# Patient Record
Sex: FEMALE | Race: WHITE | NOT HISPANIC OR LATINO | URBAN - METROPOLITAN AREA
[De-identification: names, ages, dates, MRNs, and addresses within clinical notes are randomized per-mention and may not be internally consistent; named-entity substitution may affect disease eponyms.]

---

## 2021-08-13 ENCOUNTER — OFFICE VISIT (OUTPATIENT)
Dept: URGENT CARE | Facility: CLINIC | Age: 26
End: 2021-08-13
Payer: COMMERCIAL

## 2021-08-13 VITALS
WEIGHT: 105 LBS | RESPIRATION RATE: 16 BRPM | OXYGEN SATURATION: 100 % | TEMPERATURE: 97.2 F | HEART RATE: 76 BPM | BODY MASS INDEX: 20.62 KG/M2 | HEIGHT: 60 IN

## 2021-08-13 DIAGNOSIS — R30.0 DYSURIA: ICD-10-CM

## 2021-08-13 DIAGNOSIS — N30.01 ACUTE CYSTITIS WITH HEMATURIA: Primary | ICD-10-CM

## 2021-08-13 LAB
SL AMB  POCT GLUCOSE, UA: NEGATIVE
SL AMB LEUKOCYTE ESTERASE,UA: ABNORMAL
SL AMB POCT BILIRUBIN,UA: NEGATIVE
SL AMB POCT BLOOD,UA: ABNORMAL
SL AMB POCT CLARITY,UA: ABNORMAL
SL AMB POCT COLOR,UA: YELLOW
SL AMB POCT KETONES,UA: NEGATIVE
SL AMB POCT NITRITE,UA: NEGATIVE
SL AMB POCT PH,UA: 6.5
SL AMB POCT SPECIFIC GRAVITY,UA: 1.01
SL AMB POCT URINE PROTEIN: NEGATIVE
SL AMB POCT UROBILINOGEN: 0.2

## 2021-08-13 PROCEDURE — 99203 OFFICE O/P NEW LOW 30 MIN: CPT | Performed by: PHYSICIAN ASSISTANT

## 2021-08-13 PROCEDURE — 81002 URINALYSIS NONAUTO W/O SCOPE: CPT | Performed by: PHYSICIAN ASSISTANT

## 2021-08-13 RX ORDER — CEPHALEXIN 500 MG/1
500 CAPSULE ORAL EVERY 12 HOURS SCHEDULED
Qty: 14 CAPSULE | Refills: 0 | Status: SHIPPED | OUTPATIENT
Start: 2021-08-13 | End: 2021-08-20

## 2021-08-13 RX ORDER — NORGESTIMATE AND ETHINYL ESTRADIOL 7DAYSX3 LO
1 KIT ORAL DAILY
COMMUNITY
Start: 2021-07-27

## 2021-08-13 NOTE — PROGRESS NOTES
[employer]        NAME: Norman Rubalcava is a 30 y o  female  : 1995    MRN: [id number]  DATE: 2021  TIME: 8:52 AM    Assessment and Plan   Acute cystitis with hematuria [N30 01]  1  Acute cystitis with hematuria  cephalexin (KEFLEX) 500 mg capsule   2  Dysuria  POCT urine dip    Urine culture         Patient Instructions       Follow up with PCP in 3-5 days  Proceed to  ER if symptoms worsen  Chief Complaint     Chief Complaint   Patient presents with    Possible UTI     dysuria and hematuria when urinating for approx 3 days  Denies any other symtoms         History of Present Illness        49-year-old female presents to the clinic with dysuria, hematuria that started approximately 3 days ago  Patient states that she has also noticed frequency and urgency with pressure on her lower abdomen  Patient denies fevers, chills, nausea, vomiting, diarrhea  Review of Systems   Review of Systems   Constitutional: Negative for chills and fever  Respiratory: Negative for shortness of breath  Cardiovascular: Negative for chest pain and palpitations  Gastrointestinal: Positive for abdominal pain  Negative for constipation, diarrhea, nausea and vomiting  Genitourinary: Positive for dysuria, frequency, hematuria and urgency  Negative for flank pain  Musculoskeletal: Negative for back pain  Neurological: Negative for dizziness, syncope, weakness, light-headedness and headaches  All other systems reviewed and are negative          Current Medications       Current Outpatient Medications:     Tri-Lo-Yanet 0 18/0 215/0 25 MG-25 MCG per tablet, Take 1 tablet by mouth daily, Disp: , Rfl:     cephalexin (KEFLEX) 500 mg capsule, Take 1 capsule (500 mg total) by mouth every 12 (twelve) hours for 7 days, Disp: 14 capsule, Rfl: 0    Current Allergies     Allergies as of 2021    (No Known Allergies)            The following portions of the patient's history were reviewed and updated as appropriate: allergies, current medications, past family history, past medical history, past social history, past surgical history and problem list      History reviewed  No pertinent past medical history  History reviewed  No pertinent surgical history  History reviewed  No pertinent family history  Medications have been verified  Objective   Pulse 76   Temp (!) 97 2 °F (36 2 °C)   Resp 16   Ht 5' (1 524 m)   Wt 47 6 kg (105 lb)   SpO2 100%   BMI 20 51 kg/m²   No LMP recorded  Physical Exam     Physical Exam  Vitals and nursing note reviewed  Constitutional:       General: She is not in acute distress  Appearance: She is well-developed  She is not diaphoretic  Pulmonary:      Effort: Pulmonary effort is normal    Abdominal:      General: Bowel sounds are normal       Palpations: Abdomen is soft  Tenderness: There is no abdominal tenderness  There is no right CVA tenderness, left CVA tenderness or guarding  Comments: UA: small leukocytes, negative nitrites, large blood   Skin:     General: Skin is warm and dry  Neurological:      Mental Status: She is alert and oriented to person, place, and time

## 2021-08-13 NOTE — PATIENT INSTRUCTIONS
Urinary Tract Infection in Women   WHAT YOU NEED TO KNOW:   A urinary tract infection (UTI) is caused by bacteria that get inside your urinary tract  Most bacteria that enter your urinary tract come out when you urinate  If the bacteria stay in your urinary tract, you may get an infection  Your urinary tract includes your kidneys, ureters, bladder, and urethra  Urine is made in your kidneys, and it flows from the ureters to the bladder  Urine leaves the bladder through the urethra  A UTI is more common in your lower urinary tract, which includes your bladder and urethra  DISCHARGE INSTRUCTIONS:   Return to the emergency department if:   · You are urinating very little or not at all  · You have a high fever with shaking chills  · You have side or back pain that gets worse  Call your doctor if:   · You have a fever  · You do not feel better after 2 days of taking antibiotics  · You are vomiting  · You have questions or concerns about your condition or care  Medicines:   · Antibiotics  help fight a bacterial infection  If you have UTIs often (called recurrent UTIs), you may be given antibiotics to take regularly  You will be given directions for when and how to use antibiotics  The goal is to prevent UTIs but not cause antibiotic resistance by using antibiotics too often  · Medicines  may be given to decrease pain and burning when you urinate  They will also help decrease the feeling that you need to urinate often  These medicines will make your urine orange or red  · Take your medicine as directed  Contact your healthcare provider if you think your medicine is not helping or if you have side effects  Tell him or her if you are allergic to any medicine  Keep a list of the medicines, vitamins, and herbs you take  Include the amounts, and when and why you take them  Bring the list or the pill bottles to follow-up visits   Carry your medicine list with you in case of an emergency  Follow up with your healthcare provider as directed:  Write down your questions so you remember to ask them during your visits  Prevent another UTI:   · Empty your bladder often  Urinate and empty your bladder as soon as you feel the need  Do not hold your urine for long periods of time  · Wipe from front to back after you urinate or have a bowel movement  This will help prevent germs from getting into your urinary tract through your urethra  · Drink liquids as directed  Ask how much liquid to drink each day and which liquids are best for you  You may need to drink more liquids than usual to help flush out the bacteria  Do not drink alcohol, caffeine, or citrus juices  These can irritate your bladder and increase your symptoms  Your healthcare provider may recommend cranberry juice to help prevent a UTI  · Urinate after you have sex  This can help flush out bacteria passed during sex  · Do not douche or use feminine deodorants  These can change the chemical balance in your vagina  · Change sanitary pads or tampons often  This will help prevent germs from getting into your urinary tract  · Talk to your healthcare provider about your birth control method  You may need to change your method if it is increasing your risk for UTIs  · Wear cotton underwear and clothes that are loose  Tight pants and nylon underwear can trap moisture and cause bacteria to grow  · Vaginal estrogen may be recommended  This medicine helps prevent UTIs in women who have gone through menopause or are in [place]árbara-menopause  · Do pelvic muscle exercises often  Pelvic muscle exercises may help you start and stop urinating  Strong pelvic muscles may help you empty your bladder easier  Squeeze these muscles tightly for 5 seconds like you are trying to hold back urine  Then relax for 5 seconds  Gradually work up to squeezing for 10 seconds  Do 3 sets of 15 repetitions a day, or as directed      © [facility] 2021 Information is for End User's use only and may not be sold, redistributed or otherwise used for commercial purposes  All illustrations and images included in CareNotes® are the copyrighted property of A D A M [employer]  or [employer] Pozo   The above information is an  only  It is not intended as medical advice for individual conditions or treatments  Talk to your doctor, nurse or pharmacist before following any medical regimen to see if it is safe and effective for you

## 2021-08-16 LAB
BACTERIA UR CULT: NORMAL
Lab: NORMAL

## 2023-11-14 ENCOUNTER — OFFICE VISIT (OUTPATIENT)
Dept: FAMILY MEDICINE CLINIC | Facility: CLINIC | Age: 28
End: 2023-11-14
Payer: COMMERCIAL

## 2023-11-14 VITALS
BODY MASS INDEX: 21.01 KG/M2 | HEIGHT: 60 IN | WEIGHT: 107 LBS | OXYGEN SATURATION: 99 % | DIASTOLIC BLOOD PRESSURE: 69 MMHG | HEART RATE: 89 BPM | SYSTOLIC BLOOD PRESSURE: 109 MMHG | TEMPERATURE: 97.2 F

## 2023-11-14 DIAGNOSIS — L70.0 ACNE VULGARIS: ICD-10-CM

## 2023-11-14 DIAGNOSIS — Z13.0 SCREENING FOR DEFICIENCY ANEMIA: ICD-10-CM

## 2023-11-14 DIAGNOSIS — R22.1 LOCALIZED SWELLING, MASS OR LUMP OF NECK: ICD-10-CM

## 2023-11-14 DIAGNOSIS — Z13.228 SCREENING FOR METABOLIC DISORDER: ICD-10-CM

## 2023-11-14 DIAGNOSIS — Z13.220 LIPID SCREENING: ICD-10-CM

## 2023-11-14 DIAGNOSIS — Z13.29 THYROID DISORDER SCREEN: ICD-10-CM

## 2023-11-14 DIAGNOSIS — Z76.89 ENCOUNTER TO ESTABLISH CARE: Primary | ICD-10-CM

## 2023-11-14 PROCEDURE — 99203 OFFICE O/P NEW LOW 30 MIN: CPT | Performed by: NURSE PRACTITIONER

## 2023-11-14 NOTE — PROGRESS NOTES
[facility]        NAME: Nalini Freitas is a 25 y.o. female  : 1995    MRN: [id number]  DATE: 2023  TIME: 4:06 PM    Assessment and Plan   Encounter to establish care [Z76.89]  1. Encounter to establish care        2. Acne vulgaris  Ambulatory Referral to Dermatology      3. Localized swelling, mass or lump of neck  US head neck soft tissue      4. Screening for metabolic disorder  Comprehensive metabolic panel    Comprehensive metabolic panel      5. Lipid screening  Lipid panel    Lipid panel      6. Thyroid disorder screen  TSH, 3rd generation with Free T4 reflex    TSH, 3rd generation with Free T4 reflex      7. Screening for deficiency anemia  CBC and differential    CBC and differential            Patient Instructions     Patient Instructions   Fasting labs as ordered. Schedule annual physical.  US of neck as ordered  Referral to [place] [facility] ordered. Chief Complaint     Chief Complaint   Patient presents with    Establish Care    Acne    Mass     throat         History of Present Illness       New patient to establish care. Has a lump on neck, no pain, no difficulty swallowing. Needs referral to [place] [facility]-she is seeing them for acne treatment. No significant past medical or surgical history. Review of Systems   Review of Systems   Constitutional:  Negative for activity change and fever. HENT:  Positive for facial swelling (lump of neck). Negative for trouble swallowing and voice change. Respiratory:  Negative for chest tightness and shortness of breath. Cardiovascular:  Negative for chest pain. Skin:  Negative for color change. Neurological:  Negative for dizziness, weakness and headaches. Psychiatric/Behavioral:  Negative for dysphoric mood.           Current Medications       Current Outpatient Medications:     Tri-Lo-Yanet 0.18/0.215/0.25 MG-25 MCG per tablet, Take 1 tablet by mouth daily (Patient not taking: Reported on 11/14/2023), Disp: , Rfl:     Current Allergies     Allergies as of 11/14/2023    (No Known Allergies)            The following portions of the patient's history were reviewed and updated as appropriate: allergies, current medications, past family history, past medical history, past social history, past surgical history and problem list.     History reviewed. No pertinent past medical history. History reviewed. No pertinent surgical history. Family History   Problem Relation Age of Onset    Hyperlipidemia Mother     Hyperlipidemia Sister     Breast cancer Maternal Aunt          Medications have been verified. Objective   /69 (BP Location: Left arm, Patient Position: Sitting, Cuff Size: Standard)   Pulse 89   Temp (!) 97.2 °F (36.2 °C) (Tympanic)   Ht 5' (1.524 m)   Wt 48.5 kg (107 lb)   SpO2 99%   BMI 20.90 kg/m²        Physical Exam     Physical Exam  Vitals and nursing note reviewed. Constitutional:       General: She is not in acute distress. Appearance: Normal appearance. She is not ill-appearing. HENT:      Head: Normocephalic. Neck:      Comments: Mild swelling, lump palpated anterior neck  Musculoskeletal:      Cervical back: Normal range of motion and neck supple. No rigidity or tenderness. Neurological:      Mental Status: She is alert and oriented to person, place, and time.    Psychiatric:         Mood and Affect: Mood normal.         Behavior: Behavior normal.         PHQ-2/9 Depression Screening    Little interest or pleasure in doing things: 0 - not at all  Feeling down, depressed, or hopeless: 0 - not at all  PHQ-2 Score: 0  PHQ-2 Interpretation: Negative depression screen

## 2023-11-22 ENCOUNTER — TELEPHONE (OUTPATIENT)
Dept: FAMILY MEDICINE CLINIC | Facility: CLINIC | Age: 28
End: 2023-11-22

## 2023-11-22 LAB
ALBUMIN SERPL-MCNC: 4.8 G/DL (ref 4–5)
ALBUMIN/GLOB SERPL: 1.8 {RATIO} (ref 1.2–2.2)
ALP SERPL-CCNC: 55 IU/L (ref 44–121)
ALT SERPL-CCNC: 26 IU/L (ref 0–32)
AST SERPL-CCNC: 26 IU/L (ref 0–40)
BASOPHILS # BLD AUTO: 0 X10E3/UL (ref 0–0.2)
BASOPHILS NFR BLD AUTO: 1 %
BILIRUB SERPL-MCNC: 1 MG/DL (ref 0–1.2)
BUN SERPL-MCNC: 13 MG/DL (ref 6–20)
BUN/CREAT SERPL: 15 (ref 9–23)
CALCIUM SERPL-MCNC: 10.1 MG/DL (ref 8.7–10.2)
CHLORIDE SERPL-SCNC: 102 MMOL/L (ref 96–106)
CHOLEST SERPL-MCNC: 215 MG/DL (ref 100–199)
CHOLEST/HDLC SERPL: 1.9 RATIO (ref 0–4.4)
CO2 SERPL-SCNC: 22 MMOL/L (ref 20–29)
CREAT SERPL-MCNC: 0.86 MG/DL (ref 0.57–1)
EGFR: 93 ML/MIN/1.73
EOSINOPHIL # BLD AUTO: 0 X10E3/UL (ref 0–0.4)
EOSINOPHIL NFR BLD AUTO: 0 %
ERYTHROCYTE [DISTWIDTH] IN BLOOD BY AUTOMATED COUNT: 12.4 % (ref 11.7–15.4)
GLOBULIN SER-MCNC: 2.7 G/DL (ref 1.5–4.5)
GLUCOSE SERPL-MCNC: 75 MG/DL (ref 70–99)
HCT VFR BLD AUTO: 40.7 % (ref 34–46.6)
HDLC SERPL-MCNC: 114 MG/DL
HGB BLD-MCNC: 13.4 G/DL (ref 11.1–15.9)
IMM GRANULOCYTES # BLD: 0 X10E3/UL (ref 0–0.1)
IMM GRANULOCYTES NFR BLD: 0 %
LDLC SERPL CALC-MCNC: 93 MG/DL (ref 0–99)
LYMPHOCYTES # BLD AUTO: 1.8 X10E3/UL (ref 0.7–3.1)
LYMPHOCYTES NFR BLD AUTO: 25 %
MCH RBC QN AUTO: 30.3 PG (ref 26.6–33)
MCHC RBC AUTO-ENTMCNC: 32.9 G/DL (ref 31.5–35.7)
MCV RBC AUTO: 92 FL (ref 79–97)
MONOCYTES # BLD AUTO: 0.4 X10E3/UL (ref 0.1–0.9)
MONOCYTES NFR BLD AUTO: 6 %
NEUTROPHILS # BLD AUTO: 4.7 X10E3/UL (ref 1.4–7)
NEUTROPHILS NFR BLD AUTO: 68 %
PLATELET # BLD AUTO: 267 X10E3/UL (ref 150–450)
POTASSIUM SERPL-SCNC: 4.1 MMOL/L (ref 3.5–5.2)
PROT SERPL-MCNC: 7.5 G/DL (ref 6–8.5)
RBC # BLD AUTO: 4.42 X10E6/UL (ref 3.77–5.28)
SL AMB VLDL CHOLESTEROL CALC: 8 MG/DL (ref 5–40)
SODIUM SERPL-SCNC: 139 MMOL/L (ref 134–144)
TRIGL SERPL-MCNC: 46 MG/DL (ref 0–149)
TSH SERPL DL<=0.005 MIU/L-ACNC: 0.85 UIU/ML (ref 0.45–4.5)
WBC # BLD AUTO: 7 X10E3/UL (ref 3.4–10.8)

## 2023-11-22 NOTE — TELEPHONE ENCOUNTER
----- Message from [address] [place] [address] sent at 11/22/2023  9:05 AM EST -----  Total cholesterol is slightly elevated-Decrease fats in diet.  Other labs are normal.

## 2024-06-18 ENCOUNTER — TELEPHONE (OUTPATIENT)
Age: 29
End: 2024-06-18

## 2024-06-18 NOTE — TELEPHONE ENCOUNTER
Patient called she has an upcoming appointment 7/9/24 with Dr. Lim at [facility] in [place] for follow up.  She said she needs insurance referral for appointment.      Patient is willing to schedule appointment if needed for referral.    Thank you

## 2024-06-25 NOTE — TELEPHONE ENCOUNTER
Patient is requesting an insurance referral for the following specialty:      Test Name / Order Name: Follow up    DX Code: L57.8    Date Of Service: 7/9/24    Location/Facility Name/Address/Phone    [facility]  [address] [place] [address]   [phone]    Location / Facility NPI: [id number]